# Patient Record
Sex: MALE | Race: WHITE | Employment: OTHER | URBAN - METROPOLITAN AREA
[De-identification: names, ages, dates, MRNs, and addresses within clinical notes are randomized per-mention and may not be internally consistent; named-entity substitution may affect disease eponyms.]

---

## 2021-02-07 ENCOUNTER — HOSPITAL ENCOUNTER (EMERGENCY)
Facility: CLINIC | Age: 86
Discharge: HOME | End: 2021-02-07
Attending: EMERGENCY MEDICINE | Admitting: EMERGENCY MEDICINE
Payer: MEDICARE

## 2021-02-07 VITALS
SYSTOLIC BLOOD PRESSURE: 146 MMHG | RESPIRATION RATE: 16 BRPM | TEMPERATURE: 98.7 F | HEART RATE: 80 BPM | DIASTOLIC BLOOD PRESSURE: 90 MMHG | OXYGEN SATURATION: 93 %

## 2021-02-07 DIAGNOSIS — T83.9XXA PROBLEM WITH FOLEY CATHETER, INITIAL ENCOUNTER (H): ICD-10-CM

## 2021-02-07 LAB
ALBUMIN UR-MCNC: 100 MG/DL
APPEARANCE UR: ABNORMAL
BILIRUB UR QL STRIP: NEGATIVE
COLOR UR AUTO: ABNORMAL
GLUCOSE UR STRIP-MCNC: NEGATIVE MG/DL
HGB UR QL STRIP: ABNORMAL
KETONES UR STRIP-MCNC: NEGATIVE MG/DL
LEUKOCYTE ESTERASE UR QL STRIP: ABNORMAL
NITRATE UR QL: NEGATIVE
PH UR STRIP: 8 PH (ref 5–7)
RBC #/AREA URNS AUTO: >182 /HPF (ref 0–2)
SOURCE: ABNORMAL
SP GR UR STRIP: 1.01 (ref 1–1.03)
UROBILINOGEN UR STRIP-MCNC: 0 MG/DL (ref 0–2)
WBC #/AREA URNS AUTO: 81 /HPF (ref 0–5)

## 2021-02-07 PROCEDURE — 99283 EMERGENCY DEPT VISIT LOW MDM: CPT

## 2021-02-07 PROCEDURE — 81001 URINALYSIS AUTO W/SCOPE: CPT | Performed by: EMERGENCY MEDICINE

## 2021-02-07 PROCEDURE — 51702 INSERT TEMP BLADDER CATH: CPT

## 2021-02-07 RX ORDER — CEPHALEXIN 500 MG/1
500 CAPSULE ORAL 4 TIMES DAILY
Qty: 20 CAPSULE | Refills: 0 | Status: SHIPPED | OUTPATIENT
Start: 2021-02-07 | End: 2021-02-12

## 2021-02-07 NOTE — ED TRIAGE NOTES
Pt lives in memory care at [facility]. [phone]. Pt pulled out his catheter this AM. Pt is bleeding from his penis

## 2021-02-07 NOTE — ED PROVIDER NOTES
History     Chief Complaint:  Catheter Removed     History limited by: dementia.     Howie Rubio is a 85 year old male with a history of dementia who presents for evaluation after removing his catheter. The patient arrives from [facility] care where be was found to have pulled his elmore catheter out. There is a large amount of blood soaking the shirt and area around his penis. He notes that he is not a much pain, but is otherwise limited in giving history secondary to his dementia.       Review of Systems   Reason unable to perform ROS: dementia.         Allergies:   Amoxicillin   Macrolide     Medications:   Pepto bismol   Miralax   Seroquel   Flomax   Remeron    Medical History:   Dementia   Gilbert's syndrome  Anemia   Aphasia    COPD    Social History:  Patient presents alone.  Patient lives in [facility].    Physical Exam     Patient Vitals for the past 24 hrs:   BP Temp Temp src Pulse Resp SpO2   02/07/21 0614 139/82 98.7  F (37.1  C) Oral 91 16 98 %          Physical Exam  GENERAL: well developed, pleasant  HEAD: atraumatic  EYES: pupils reactive, extraocular muscles intact, conjunctivae normal  ENT:  mucus membranes moist  NECK:  trachea midline, normal range of motion  RESPIRATORY: no tachypnea, breath sounds clear to auscultation   CVS: normal S1/S2, no murmurs, intact distal pulses  ABDOMEN: soft, nontender, nondistention  : Blood on his t-shirt and around the meatus of penis.   MUSCULOSKELETAL: no deformities  SKIN: warm and dry, no acute rashes or ulceration  NEURO: GCS 15, cranial nerves intact, mild confusion   PSYCH:  Mood/affect normal    Emergency Department Course     Laboratory:    UA with Microscopic: Blood large, pH 8.0, protein albumin 100, Leukocyte Esterase moderate, WBC/HPF 81 (H), RBC/HPF >182 (H) o/w WNL      Emergency Department Course:    Reviewed:  I reviewed nursing notes and vitals    Assessments:  0615 I assessed the patient as above.   0830 Patient rechecked and updated.       Consults:   6916 I spoke with the Urology service regarding patient's presentation, findings, and plan of care.     Disposition:  Discharged to home.    Impression & Plan     Medical Decision Making:  Howie Rubio is a 85 year old male who presents from care facility after pulling his catheter out.  Initially we had very limited information on him.  I did speak with urology and instructed to place a 20 Fijian coudé back in.  He had a little bit of bleeding but it was quite light in color.  Given the instrumentation will placed on antibiotics for safety.  His daughter did show up.  She notes that he recently had a hospitalization in [place] I believe and was given Haldol for prolonged period of time which she did not agree with but has now been moved to be closer to her as he struggling with dementia.  During that hospitalization it was noted that he had urinary retention and his Calixto catheter was placed and he has an upcoming appointment to see a urologist to see if he could remove the catheter.  Encouraged her to continue with the plan.    Diagnosis:     ICD-10-CM    1. Problem with Calixto catheter, initial encounter (H)  T83.9XXA         Disposition:  Discharged to home.    Scribe Disclosure:  I, Benny Mendez, am serving as a scribe at 6:19 AM on 2/7/2021 to document services personally performed by Pranay Harrington MD based on my observations and the provider's statements to me.     [facility]  February 7, 2021      Pranay Harrington MD  02/07/21 1024

## 2021-02-07 NOTE — ED TRIAGE NOTES
Pt lives in memory care at [facility]. [phone]. Pt pulled out his catheter this AM. Pt is bleeding from his penis

## 2021-02-07 NOTE — ED NOTES
Bed: ED02  Expected date: 2/7/21  Expected time: 5:52 AM  Means of arrival: Ambulance  Comments:  Graciela M1 85M pull cath out

## 2021-12-22 ENCOUNTER — LAB REQUISITION (OUTPATIENT)
Dept: LAB | Facility: CLINIC | Age: 86
End: 2021-12-22
Payer: MEDICARE

## 2021-12-22 DIAGNOSIS — U07.1 COVID-19: ICD-10-CM

## 2021-12-22 PROCEDURE — U0005 INFEC AGEN DETEC AMPLI PROBE: HCPCS | Mod: ORL | Performed by: FAMILY MEDICINE

## 2021-12-23 LAB — SARS-COV-2 RNA RESP QL NAA+PROBE: NEGATIVE
